# Patient Record
Sex: MALE | URBAN - METROPOLITAN AREA
[De-identification: names, ages, dates, MRNs, and addresses within clinical notes are randomized per-mention and may not be internally consistent; named-entity substitution may affect disease eponyms.]

---

## 2024-07-25 ENCOUNTER — TELEPHONE (OUTPATIENT)
Dept: FAMILY MEDICINE CLINIC | Age: 24
End: 2024-07-25

## 2024-07-25 NOTE — TELEPHONE ENCOUNTER
----- Message from Marion Saenz sent at 7/24/2024  3:54 PM EDT -----  Regarding: [facility] Appointment Request  [facility] Appointment Request    Patient needs appointment for [facility] Appointment Type: New Patient.    Patient Requested Dates(s): any day as soon as there's available appointment  Patient Requested Time: anytime  Provider Name: Dr. Dirk Keenan    Reason for Appointment Request: New Patient - No appointments available during search. Patient would like to book the appointment for a New Patient appointment for Dr. Kenneth Kenean to be his PCP.    --------------------------------------------------------------------------------------------------------------------------    Relationship to Patient: Guardian Prema/Mother     Call Back Information: OK to leave message on voicemail  Preferred Call Back Number: Phone [phone]

## 2024-08-21 ENCOUNTER — OFFICE VISIT (OUTPATIENT)
Dept: FAMILY MEDICINE CLINIC | Age: 24
End: 2024-08-21
Payer: COMMERCIAL

## 2024-08-21 VITALS
HEART RATE: 84 BPM | OXYGEN SATURATION: 97 % | DIASTOLIC BLOOD PRESSURE: 78 MMHG | BODY MASS INDEX: 38.17 KG/M2 | RESPIRATION RATE: 18 BRPM | HEIGHT: 73 IN | SYSTOLIC BLOOD PRESSURE: 130 MMHG | WEIGHT: 288 LBS

## 2024-08-21 DIAGNOSIS — Z00.00 WELL ADULT EXAM: Primary | ICD-10-CM

## 2024-08-21 PROCEDURE — 99385 PREV VISIT NEW AGE 18-39: CPT | Performed by: FAMILY MEDICINE

## 2024-08-21 RX ORDER — ALBUTEROL SULFATE 90 UG/1
2 AEROSOL, METERED RESPIRATORY (INHALATION) EVERY 4 HOURS PRN
COMMUNITY

## 2024-08-21 RX ORDER — FEXOFENADINE HCL 180 MG/1
180 TABLET ORAL DAILY
COMMUNITY

## 2024-08-21 SDOH — ECONOMIC STABILITY: FOOD INSECURITY: WITHIN THE PAST 12 MONTHS, THE FOOD YOU BOUGHT JUST DIDN'T LAST AND YOU DIDN'T HAVE MONEY TO GET MORE.: NEVER TRUE

## 2024-08-21 SDOH — HEALTH STABILITY: PHYSICAL HEALTH: ON AVERAGE, HOW MANY DAYS PER WEEK DO YOU ENGAGE IN MODERATE TO STRENUOUS EXERCISE (LIKE A BRISK WALK)?: 5 DAYS

## 2024-08-21 SDOH — ECONOMIC STABILITY: FOOD INSECURITY: WITHIN THE PAST 12 MONTHS, YOU WORRIED THAT YOUR FOOD WOULD RUN OUT BEFORE YOU GOT MONEY TO BUY MORE.: NEVER TRUE

## 2024-08-21 SDOH — ECONOMIC STABILITY: INCOME INSECURITY: HOW HARD IS IT FOR YOU TO PAY FOR THE VERY BASICS LIKE FOOD, HOUSING, MEDICAL CARE, AND HEATING?: NOT HARD AT ALL

## 2024-08-21 SDOH — HEALTH STABILITY: PHYSICAL HEALTH: ON AVERAGE, HOW MANY MINUTES DO YOU ENGAGE IN EXERCISE AT THIS LEVEL?: 50 MIN

## 2024-08-21 ASSESSMENT — PATIENT HEALTH QUESTIONNAIRE - PHQ9
SUM OF ALL RESPONSES TO PHQ QUESTIONS 1-9: 0
SUM OF ALL RESPONSES TO PHQ9 QUESTIONS 1 & 2: 0
1. LITTLE INTEREST OR PLEASURE IN DOING THINGS: NOT AT ALL
SUM OF ALL RESPONSES TO PHQ QUESTIONS 1-9: 0
SUM OF ALL RESPONSES TO PHQ QUESTIONS 1-9: 0
2. FEELING DOWN, DEPRESSED OR HOPELESS: NOT AT ALL
SUM OF ALL RESPONSES TO PHQ QUESTIONS 1-9: 0

## 2024-08-21 NOTE — PATIENT INSTRUCTIONS
Low Carb Eating with Intermittent Fasting  target < 100 grams of carb daily; ZERO grained based carbs  Get carbs mostly from whole fruits - strawberries, raspberries, blackberries, apples, oranges, pineapples, bananas etc.    Necessary Electrolyte Supplementation when eating Low Carb (< 100 gram of carb daily)  formerly [facility] Pink Salt - 1/4 teaspoon in 12 ounces of water daily first thing in AM (~500 mg sodium)  Minimum 1 medium sized banana daily (~ 500 mg potassium)  If feeling weak/headache/tired/palpitations, supplement as above twice daily    Intermittent Fasting (\"I.F.\") / Eating Window   Only eat between noon and 8 PM  Water any time  Coffee with cream and no more than 1 teaspoon sugar OK in AM    Google/ YouTube AUTOPHAGY - a primary benefit of IF    Other helpful books and websites  1) Wheat Belly by Nick Babb MD ([email or web address])  2) The Primal Blueprint by Yong Candelaria ([email or web address] - review success stories)  2) Living Paleo For Dummies

## 2024-08-21 NOTE — PROGRESS NOTES
2024    Dirk Castellanos (:  2000) is a 24 y.o. male, here for evaluation of the following chief complaint(s):  New Patient      ASSESSMENT/PLAN:     Diagnosis Orders   1. Well adult exam            Return in about 1 year (around 2025) for Well Adult.    An electronic signature was used to authenticate this note.    SUBJECTIVE/OBJECTIVE:  (NOTE : prior results listed below reviewed at this visit to assist in medical decision making.)    HPI / ROS    # Preventive and other issues  # no complaints            Wt Readings from Last 3 Encounters:   24 130.6 kg (288 lb)       BP Readings from Last 3 Encounters:   24 130/78       PHYSICAL EXAM  Vitals:    24 1032   BP: 130/78   Pulse: 84   Resp: 18   SpO2: 97%   Weight: 130.6 kg (288 lb)   Height: 1.854 m (6' 1\")     A&o  Car reg no MGR  Lungs cta  Skin no jaundice  Eyes anicteric

## 2024-09-19 ENCOUNTER — PATIENT MESSAGE (OUTPATIENT)
Dept: FAMILY MEDICINE CLINIC | Age: 24
End: 2024-09-19

## 2024-09-20 RX ORDER — ALBUTEROL SULFATE 90 UG/1
2 INHALANT RESPIRATORY (INHALATION) EVERY 4 HOURS PRN
Qty: 18 G | Refills: 3 | Status: SHIPPED | OUTPATIENT
Start: 2024-09-20

## 2024-10-24 RX ORDER — ALBUTEROL SULFATE 90 UG/1
2 INHALANT RESPIRATORY (INHALATION) EVERY 4 HOURS PRN
Qty: 18 G | Refills: 3 | Status: SHIPPED | OUTPATIENT
Start: 2024-10-24

## 2024-10-24 NOTE — TELEPHONE ENCOUNTER
Last office visit 8/21/2024       Next office visit scheduled Visit date not found    Requested Prescriptions     Pending Prescriptions Disp Refills    albuterol sulfate HFA (PROVENTIL;VENTOLIN;PROAIR) 108 (90 Base) MCG/ACT inhaler 18 g 3     Sig: Inhale 2 puffs into the lungs every 4 hours as needed for Shortness of Breath